# Patient Record
(demographics unavailable — no encounter records)

---

## 2025-05-15 NOTE — HISTORY OF PRESENT ILLNESS
[de-identified] : 1 year follow up appt for this 68 yo m with allergic rhinitis, reflux laryngitis. He explains that his reflux is well controlled with omeprazole and famotidine and diet and mech precautions, his allergic rhinitis has worsened this year. Denies sinus infections but is congested.

## 2025-05-15 NOTE — REASON FOR VISIT
[Subsequent Evaluation] : a subsequent evaluation for [FreeTextEntry2] : allergic rhinitis, reflux laryngitis

## 2025-05-15 NOTE — PHYSICAL EXAM
[Nasal Endoscopy Performed] : nasal endoscopy was performed, see procedure section for findings [Midline] : trachea located in midline position [Normal] : no rashes [de-identified] : r copious cerumen impaction removed atraumatically with suction, l eac and tm nl [de-identified] : ar [de-identified] : ar

## 2025-05-15 NOTE — ASSESSMENT
[FreeTextEntry1] : cerumen removed r ear felt better; l no cerumen a.r.- floanse, allegra prn, avoidance- finds it more difficult here this yr than when he is home in [place] reflux laryngitis - continue diet, mech omep, famotidine prn rtc 6 mo and as needed

## 2025-05-15 NOTE — PROCEDURE
[Posterior Lesion] : posterior lesion [Anterior rhinoscopy insufficient to account for symptoms] : anterior rhinoscopy insufficient to account for symptoms [Topical Lidocaine] : topical lidocaine [Oxymetazoline HCl] : oxymetazoline HCl [Flexible Endoscope] : examined with the flexible endoscope [Serial Number: ___] : Serial Number: [unfilled] [Allergic] : allergic signs [Normal] : the nasopharynx was normal [Cerumen Impaction] : Cerumen Impaction [Same] : same as the Pre Op Dx. [] : Removal of Cerumen [de-identified] : done for nasal congestion - could not see posterior nasal cavity with speculum  [FreeTextEntry6] : r copious cerumen impaction removed atraumatically with suction b eacs and tms nl